# Patient Record
(demographics unavailable — no encounter records)

---

## 2024-10-29 NOTE — ASSESSMENT
[FreeTextEntry1] : likely with viral URI c/w supportive treatment recommend Flonase nasal spray  can try over the counter Coricidin  nasal swab done to r/o COVID, Flu, RSV

## 2024-10-29 NOTE — REVIEW OF SYSTEMS
[Cough] : cough [Fever] : no fever [Chills] : no chills [Sore Throat] : no sore throat [Chest Pain] : no chest pain [Shortness Of Breath] : no shortness of breath

## 2024-10-29 NOTE — HISTORY OF PRESENT ILLNESS
[FreeTextEntry8] : 64 year old male presents c/o 2-3 day h/o sinus and chest congestion along with a mild cough. He is feeling fatigued and run down. He recently returned home last night from a 2 week cruise. He denies any fever. He tried taking Robitussin last night to help.

## 2024-10-29 NOTE — PHYSICAL EXAM
[No Acute Distress] : no acute distress [Well Nourished] : well nourished [Well Developed] : well developed [Normal TMs] : both tympanic membranes were normal [Normal Appearance] : was normal in appearance [Neck Supple] : was supple [No Respiratory Distress] : no respiratory distress  [Clear to Auscultation] : lungs were clear to auscultation bilaterally [Normal Rate] : normal rate  [Regular Rhythm] : with a regular rhythm [Normal S1, S2] : normal S1 and S2 [No Murmur] : no murmur heard [Normal Anterior Cervical Nodes] : no anterior cervical lymphadenopathy [Alert and Oriented x3] : oriented to person, place, and time [de-identified] : Oropharynx with mild erythema, no exudates, postnasal drip present

## 2025-02-18 NOTE — HISTORY OF PRESENT ILLNESS
[Spouse] : spouse [FreeTextEntry1] : pt here  for cpe and management of hld htn  [de-identified] : feeling well

## 2025-02-18 NOTE — HEALTH RISK ASSESSMENT
[Very Good] : ~his/her~  mood as very good [Yes] : Yes [Monthly or less (1 pt)] : Monthly or less (1 point) [0] : 2) Feeling down, depressed, or hopeless: Not at all (0) [PHQ-2 Negative - No further assessment needed] : PHQ-2 Negative - No further assessment needed [Former] : Former [5-9] : 5-9 [> 15 Years] : > 15 Years [Patient reported colonoscopy was normal] : Patient reported colonoscopy was normal [None] : None [With Family] : lives with family [Retired] : retired [High School] : high school [] :  [# Of Children ___] : has [unfilled] children [Smoke Detector] : smoke detector [Carbon Monoxide Detector] : carbon monoxide detector [Seat Belt] :  uses seat belt [de-identified] : no  [[id number]] : 0 [Change in mental status noted] : No change in mental status noted [Reports changes in hearing] : Reports no changes in hearing [Reports changes in vision] : Reports no changes in vision [Reports changes in dental health] : Reports no changes in dental health [ColonoscopyDate] : 6/24 [de-identified] :